# Patient Record
Sex: MALE | Race: BLACK OR AFRICAN AMERICAN | NOT HISPANIC OR LATINO | Employment: FULL TIME | URBAN - METROPOLITAN AREA
[De-identification: names, ages, dates, MRNs, and addresses within clinical notes are randomized per-mention and may not be internally consistent; named-entity substitution may affect disease eponyms.]

---

## 2021-11-30 ENCOUNTER — OFFICE VISIT (OUTPATIENT)
Dept: FAMILY MEDICINE | Facility: CLINIC | Age: 31
End: 2021-11-30
Payer: COMMERCIAL

## 2021-11-30 VITALS
TEMPERATURE: 98.2 F | RESPIRATION RATE: 16 BRPM | WEIGHT: 176.6 LBS | HEART RATE: 71 BPM | DIASTOLIC BLOOD PRESSURE: 86 MMHG | OXYGEN SATURATION: 100 % | SYSTOLIC BLOOD PRESSURE: 158 MMHG

## 2021-11-30 DIAGNOSIS — M54.50 PAIN IN RIGHT LUMBAR REGION OF BACK: ICD-10-CM

## 2021-11-30 DIAGNOSIS — M72.2 PLANTAR FASCIITIS: Primary | ICD-10-CM

## 2021-11-30 PROCEDURE — 99203 OFFICE O/P NEW LOW 30 MIN: CPT | Performed by: PHYSICIAN ASSISTANT

## 2021-11-30 RX ORDER — METHYLPREDNISOLONE 4 MG
TABLET, DOSE PACK ORAL
Qty: 21 TABLET | Refills: 0 | Status: SHIPPED | OUTPATIENT
Start: 2021-11-30 | End: 2021-12-20

## 2021-11-30 ASSESSMENT — ENCOUNTER SYMPTOMS
HEMATURIA: 0
FEVER: 0
WEAKNESS: 0
BACK PAIN: 1
ABDOMINAL PAIN: 0
FREQUENCY: 0
NUMBNESS: 1
UNEXPECTED WEIGHT CHANGE: 0
NAUSEA: 0
DIAPHORESIS: 0
CHILLS: 0
VOMITING: 0
DYSURIA: 0

## 2021-11-30 NOTE — PROGRESS NOTES
Patient presents with:  Back Pain: off and on x6-7months, R low back that radiates down thigh, now getting worse more constant      Clinical Decision Making: Patient experiencing left plantar foot pain in a job that requires longstanding.  Suspect plantar fasciitis.  Supportive cares were discussed, and heel cups were prescribed.  Patient also reports intermittent left hand numbness, suspect carpal tunnel given the repetitive hand motions using his job.  Recommend wrist brace.  Right lumbar back pain with radiation down sciatic nerve.  Suspect muscular strain or disc herniation.  Straight leg raise is negative, so disc herniation unlikely.  Recommend Medrol Dosepak and follow-up with primary care if symptoms fail to improve.      ICD-10-CM    1. Plantar fasciitis  M72.2 Ankle/Foot Bracing Supplies Order for DME - ONLY FOR DME   2. Pain in right lumbar region of back  M54.50 methylPREDNISolone (MEDROL DOSEPAK) 4 MG tablet therapy pack       Patient Instructions   Plantar Fasciitis:  1. Limit barefoot walking as much as possible. Wear supportive shoes with the heel cups cushions in them.   2. Ice the heels using a frozen sana cup of water.   3. Take Ibuprofen 600 mg every 6 hours for pain once you are done with the Medrol Dosepack.    4. Try to limit pounding exercises like running.   5. Wrap ankles with ACE wrap in a 90 degree angle when you sleep.   6. Follow up with your primary care provider if you are not having any improvement in your symptoms over the course of the next 10 days.   Plantar Fasciitis  Plantar fasciitis is a painful swelling of the plantar fascia. The plantar fascia is a thick, fibrous layer of tissue. It covers the bones on the bottom of your foot. And it supports the foot bones in an arched position.  This can happen gradually or suddenly. Heel pain can be sharp, like a knife sticking into the bottom of your foot. You may feel pain after exercising, long-distance jogging, stair climbing, long  periods of standing, or after standing up.  Risk factors include: non-active lifestyle, arthritis, diabetes, obesity or recent weight gain, flat foot, high arch. Wearing high heels, loose shoes, or shoes with poor arch support for long periods of time adds to the risk. This problem is commonly found in runners and dancers. It also found in people who stand on hard surfaces for long periods of time.  Foot pain from this condition is usually worse in the morning. But it improves with walking. By the end of the day there may be a dull aching. Treatment requires short-term rest and controlling swelling. It may take up to 9 months before all symptoms go away. Rarely, a steroid injection into the foot, or surgery, may be needed.  Home care    Choose supportive shoes with good arch support and shock absorbency. Replace athletic shoes when they become worn out. Don t walk or run barefoot.    Premade or custom-fitted shoe inserts may be helpful. Inserts made of silicone seem to be the most effective. Custom-made inserts can be provided by a podiatrist or foot specialist, physical therapist, or orthopedist.    Premade or custom-made night splints keep the heel stretched out while you sleep. They may prevent morning pain.    Avoid activities that stress the feet: jogging, prolonged standing or walking, contact sports, etc.    First thing in the morning and before sports, stretch the bottom of your feet. Gently flex your ankle so the toes move toward your knee.    Icing may help control heel pain. Apply an ice pack to the heel for 10-20 minutes as a preventive. Or ice your heel after a severe flare-up of symptoms. You may repeat this every 1-2 hours as needed.    You may use over-the-counter pain medicine to control pain, unless another medicine was prescribed. Anti-inflammatory pain medicines, such as ibuprofen or naproxen, may work better than acetaminophen. If you have chronic liver or kidney disease or ever had a stomach  ulcer or GI bleeding, talk with your healthcare provider before using these medicines.  When to seek medical advice  Call your healthcare provider right away if any of these occur:    Foot swelling    Redness with increasing pain    Carpal Tunnel:  1. Find carpal tunnel wrist brace and wear it during night and day to keep your wrist in a neutral position.     Lumbar Back Pain:   1.  Continue with light exercise such as soccer and walks.  2.  Begin taking Medrol Dosepak this medicine is best taken in the morning and with food.   3. Alternate between ice and heat on the back.   4. Avoid heavy lifting.   5.  Seek emergency medical attention if you develop loss of control of bowel or bladder function, saddle numbness, or lower extremity weakness.        HPI:  Alex Wallace is a 31 year old male who presents today complaining of right low back pain that is been going on and off for the past 6 or 7 months.  There is radiation of the pain down the right thigh.  Patient came in because pain is worsening and becoming more constant. Pain is worse when walking on incline and when sitting. Pain is better when laying flat or walking on flat ground. 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 He participates in soccer casually 2 times per week for an hour at a time. Back pain is better when he is playing soccer.     Patient also reports intermittent plantar foot pain.  He states it is worse when he is standing for long periods of time and his assembly job.  He generally wears work boots.  At times he experiences some numbness on the lateral aspect of his ankle.    Patient also reports intermittent numbness of the left hand.  His assembly job does require a lot of repetitive hand movements.  He denies any significant hand weakness.  He feels like her swelling in the wrist at times.  Patient is right-hand dominant    History obtained from the patient.    Problem List:  There are no relevant problems documented for  this patient.      No past medical history on file.    Social History     Tobacco Use     Smoking status: Light Tobacco Smoker     Smokeless tobacco: Never Used   Substance Use Topics     Alcohol use: Not on file       Review of Systems   Constitutional: Negative for chills, diaphoresis, fever and unexpected weight change.   Gastrointestinal: Negative for abdominal pain, nausea and vomiting.   Genitourinary: Negative for dysuria, enuresis, frequency and hematuria.   Musculoskeletal: Positive for back pain. Negative for gait problem.   Neurological: Positive for numbness (Lateral left ankle, and occasional left hand). Negative for weakness.       Vitals:    11/30/21 1125 11/30/21 1255   BP: (!) 156/89 (!) 158/86   BP Location:  Right arm   Patient Position:  Sitting   Cuff Size:  Adult Regular   Pulse: 87 71   Resp: 16    Temp: 98.2  F (36.8  C)    TempSrc: Oral    SpO2: 100%    Weight: 80.1 kg (176 lb 9.6 oz)        Physical Exam  Vitals and nursing note reviewed.   Constitutional:       General: He is not in acute distress.     Appearance: He is not toxic-appearing or diaphoretic.   HENT:      Head: Normocephalic and atraumatic.      Right Ear: External ear normal.      Left Ear: External ear normal.   Eyes:      Conjunctiva/sclera: Conjunctivae normal.   Pulmonary:      Effort: Pulmonary effort is normal. No respiratory distress.   Musculoskeletal:      Lumbar back: Tenderness present. No bony tenderness. Normal range of motion. Negative right straight leg raise test and negative left straight leg raise test.      Comments: No significant tenderness of the plantar surface of the left foot.   Neurological:      Mental Status: He is alert.      Motor: No weakness.   Psychiatric:         Mood and Affect: Mood normal.         Behavior: Behavior normal.         Thought Content: Thought content normal.         Judgment: Judgment normal.       At the end of the encounter, I discussed results, diagnosis, medications.  Discussed red flags for immediate return to clinic/ER, as well as indications for follow up if no improvement. Patient understood and agreed to plan. Patient was stable for discharge.

## 2021-11-30 NOTE — LETTER
November 30, 2021      Alex Wallace  [address] [address]        To Whom It May Concern:    Alex Wallace  was seen on 11/30/2021.  Please excuse him  until 12/7/2021 due to injury.        Sincerely,        Emilie Lambert PA-C

## 2021-11-30 NOTE — PATIENT INSTRUCTIONS
Plantar Fasciitis:  1. Limit barefoot walking as much as possible. Wear supportive shoes with the heel cups cushions in them.   2. Ice the heels using a frozen sana cup of water.   3. Take Ibuprofen 600 mg every 6 hours for pain once you are done with the Medrol Dosepack.    4. Try to limit pounding exercises like running.   5. Wrap ankles with ACE wrap in a 90 degree angle when you sleep.   6. Follow up with your primary care provider if you are not having any improvement in your symptoms over the course of the next 10 days.   Plantar Fasciitis  Plantar fasciitis is a painful swelling of the plantar fascia. The plantar fascia is a thick, fibrous layer of tissue. It covers the bones on the bottom of your foot. And it supports the foot bones in an arched position.  This can happen gradually or suddenly. Heel pain can be sharp, like a knife sticking into the bottom of your foot. You may feel pain after exercising, long-distance jogging, stair climbing, long periods of standing, or after standing up.  Risk factors include: non-active lifestyle, arthritis, diabetes, obesity or recent weight gain, flat foot, high arch. Wearing high heels, loose shoes, or shoes with poor arch support for long periods of time adds to the risk. This problem is commonly found in runners and dancers. It also found in people who stand on hard surfaces for long periods of time.  Foot pain from this condition is usually worse in the morning. But it improves with walking. By the end of the day there may be a dull aching. Treatment requires short-term rest and controlling swelling. It may take up to 9 months before all symptoms go away. Rarely, a steroid injection into the foot, or surgery, may be needed.  Home care    Choose supportive shoes with good arch support and shock absorbency. Replace athletic shoes when they become worn out. Don t walk or run barefoot.    Premade or custom-fitted shoe inserts may be helpful. Inserts made of silicone  seem to be the most effective. Custom-made inserts can be provided by a podiatrist or foot specialist, physical therapist, or orthopedist.    Premade or custom-made night splints keep the heel stretched out while you sleep. They may prevent morning pain.    Avoid activities that stress the feet: jogging, prolonged standing or walking, contact sports, etc.    First thing in the morning and before sports, stretch the bottom of your feet. Gently flex your ankle so the toes move toward your knee.    Icing may help control heel pain. Apply an ice pack to the heel for 10-20 minutes as a preventive. Or ice your heel after a severe flare-up of symptoms. You may repeat this every 1-2 hours as needed.    You may use over-the-counter pain medicine to control pain, unless another medicine was prescribed. Anti-inflammatory pain medicines, such as ibuprofen or naproxen, may work better than acetaminophen. If you have chronic liver or kidney disease or ever had a stomach ulcer or GI bleeding, talk with your healthcare provider before using these medicines.  When to seek medical advice  Call your healthcare provider right away if any of these occur:    Foot swelling    Redness with increasing pain    Carpal Tunnel:  1. Find carpal tunnel wrist brace and wear it during night and day to keep your wrist in a neutral position.     Lumbar Back Pain:   1.  Continue with light exercise such as soccer and walks.  2.  Begin taking Medrol Dosepak this medicine is best taken in the morning and with food.   3. Alternate between ice and heat on the back.   4. Avoid heavy lifting.   5.  Seek emergency medical attention if you develop loss of control of bowel or bladder function, saddle numbness, or lower extremity weakness.

## 2021-12-01 ENCOUNTER — TELEPHONE (OUTPATIENT)
Dept: FAMILY MEDICINE | Facility: CLINIC | Age: 31
End: 2021-12-01

## 2021-12-01 ENCOUNTER — VIRTUAL VISIT (OUTPATIENT)
Dept: FAMILY MEDICINE | Facility: CLINIC | Age: 31
End: 2021-12-01
Payer: COMMERCIAL

## 2021-12-01 DIAGNOSIS — R51.9 NONINTRACTABLE HEADACHE, UNSPECIFIED CHRONICITY PATTERN, UNSPECIFIED HEADACHE TYPE: Primary | ICD-10-CM

## 2021-12-01 DIAGNOSIS — R52 BODY ACHES: ICD-10-CM

## 2021-12-01 DIAGNOSIS — Z20.822 EXPOSURE TO 2019 NOVEL CORONAVIRUS: ICD-10-CM

## 2021-12-01 PROCEDURE — 99213 OFFICE O/P EST LOW 20 MIN: CPT | Mod: 95 | Performed by: FAMILY MEDICINE

## 2021-12-01 RX ORDER — METHYLPREDNISOLONE 4 MG
TABLET, DOSE PACK ORAL
COMMUNITY
Start: 2021-11-30 | End: 2021-12-20

## 2021-12-01 NOTE — TELEPHONE ENCOUNTER
Virtual visit with patient today regarding possible exposure to COVID-19.  Testing to be scheduled.  He has not previously been vaccinated for COVID-19, but is interested in vaccination.  He would like to schedule this through [facility].  Would you be able to phone him to help in setting up a nurse visit for the vaccine?   Thanks,  Julian Joaquin MD

## 2021-12-01 NOTE — PROGRESS NOTES
Alex is a 31 year old who is being evaluated via a billable telephone visit.      What phone number would you like to be contacted at? [phone]  How would you like to obtain your AVS? Mail a copy    Assessment & Plan     Alex was seen today for covid concern.    Diagnoses and all orders for this visit:    Nonintractable headache, unspecified chronicity pattern, unspecified headache type  -     Symptomatic COVID-19 Virus (Coronavirus) by PCR; Future    Body aches  -     Symptomatic COVID-19 Virus (Coronavirus) by PCR; Future    Exposure to 2019 novel coronavirus    recommendations as per patient instructions below:     Patient Instructions   I would recommend testing for COVID-19, as we had discussed.  You should be receiving a call from  within the next several hours, to help set up a time and location for testing to be performed. Please isolate yourself from the community, as well as from household members, at least until your test results return (typically within 24 hours). If you have access to [employer], your results will be available there and if you do not, a phone call will be made to you (if the test is positive) or a letter will be mailed to your home (if the test is negative).      If a positive test is obtained, we recommend that people isolate until :   1. symptoms are improving  2. they are without fever for at least 24 hours (without use of Ibuprofen or acetaminophen)  3. It has been at least 10 days since their symptoms began.     For close contacts of people who test positive for COVID-19, recommendation is for isolation for 14 days after most recent contact (this could be decreased to 10 days if negative COVID-19 testing is obtained).     We will also contact you to help in scheduling the COVID-19 vaccines.       Return today (on 12/1/2021) for COVID-19 testing (to be scheduled).    Julian Joaquin MD  [facility]   Alex is a 31 year old who  presents for the following health issues    HPI   COVID-19 exposure - occurred on 11/24/21 while at work.  Patient was in close contact with co-worker who subsequently (the following day) was diagnosed with COVID-19.  Patient began to note some headache and body aches on 11/27/21.  These have improved over the past couple of days.  Patient notes no cough, rhinorrhea, fevers/chills, sore throat, or diarrhea.  Appetite and po intake have been normal.  His employer is requiring negative COVID-19 testing before he can return to work.  He requests orders for this.       Objective    Vitals - Patient Reported  Weight (Patient Reported): 68 kg (150 lb)  Physical Exam   Objective    General: alert/oriented to person/place. Answers questions appropriately.   Affect: pleasant, cooperative.   Respiratory: no labored breathing noted.  No coughing during visit.    COVID-19 PCR : pending       Phone call duration: 8 minutes

## 2021-12-01 NOTE — PATIENT INSTRUCTIONS
I would recommend testing for COVID-19, as we had discussed.  You should be receiving a call from  within the next several hours, to help set up a time and location for testing to be performed. Please isolate yourself from the community, as well as from household members, at least until your test results return (typically within 24 hours). If you have access to "[employer] your results will be available there and if you do not, a phone call will be made to you (if the test is positive) or a letter will be mailed to your home (if the test is negative).      If a positive test is obtained, we recommend that people isolate until :   1. symptoms are improving  2. they are without fever for at least 24 hours (without use of Ibuprofen or acetaminophen)  3. It has been at least 10 days since their symptoms began.     For close contacts of people who test positive for COVID-19, recommendation is for isolation for 14 days after most recent contact (this could be decreased to 10 days if negative COVID-19 testing is obtained).     We will also contact you to help in scheduling the COVID-19 vaccines.

## 2021-12-03 ENCOUNTER — LAB (OUTPATIENT)
Dept: FAMILY MEDICINE | Facility: CLINIC | Age: 31
End: 2021-12-03
Attending: FAMILY MEDICINE
Payer: COMMERCIAL

## 2021-12-03 DIAGNOSIS — R52 BODY ACHES: ICD-10-CM

## 2021-12-03 DIAGNOSIS — R51.9 NONINTRACTABLE HEADACHE, UNSPECIFIED CHRONICITY PATTERN, UNSPECIFIED HEADACHE TYPE: ICD-10-CM

## 2021-12-03 PROCEDURE — U0003 INFECTIOUS AGENT DETECTION BY NUCLEIC ACID (DNA OR RNA); SEVERE ACUTE RESPIRATORY SYNDROME CORONAVIRUS 2 (SARS-COV-2) (CORONAVIRUS DISEASE [COVID-19]), AMPLIFIED PROBE TECHNIQUE, MAKING USE OF HIGH THROUGHPUT TECHNOLOGIES AS DESCRIBED BY CMS-2020-01-R: HCPCS

## 2021-12-03 PROCEDURE — U0005 INFEC AGEN DETEC AMPLI PROBE: HCPCS

## 2021-12-04 LAB — SARS-COV-2 RNA RESP QL NAA+PROBE: NEGATIVE

## 2021-12-08 NOTE — TELEPHONE ENCOUNTER
Pt has appt scheduled for 12/20 to see a provider in person. Will get vaccine at that time.    Jacquie Madrigal MA on 12/8/2021 at 9:23 AM

## 2021-12-12 ENCOUNTER — HEALTH MAINTENANCE LETTER (OUTPATIENT)
Age: 31
End: 2021-12-12

## 2021-12-20 ENCOUNTER — OFFICE VISIT (OUTPATIENT)
Dept: FAMILY MEDICINE | Facility: CLINIC | Age: 31
End: 2021-12-20
Payer: COMMERCIAL

## 2021-12-20 VITALS
WEIGHT: 183.5 LBS | TEMPERATURE: 98.3 F | RESPIRATION RATE: 16 BRPM | BODY MASS INDEX: 29.49 KG/M2 | DIASTOLIC BLOOD PRESSURE: 80 MMHG | OXYGEN SATURATION: 99 % | HEART RATE: 70 BPM | HEIGHT: 66 IN | SYSTOLIC BLOOD PRESSURE: 138 MMHG

## 2021-12-20 DIAGNOSIS — M54.41 CHRONIC RIGHT-SIDED LOW BACK PAIN WITH RIGHT-SIDED SCIATICA: Primary | ICD-10-CM

## 2021-12-20 DIAGNOSIS — G89.29 CHRONIC RIGHT-SIDED LOW BACK PAIN WITH RIGHT-SIDED SCIATICA: Primary | ICD-10-CM

## 2021-12-20 DIAGNOSIS — Z23 HIGH PRIORITY FOR 2019 NOVEL CORONAVIRUS VACCINATION: ICD-10-CM

## 2021-12-20 PROCEDURE — 99203 OFFICE O/P NEW LOW 30 MIN: CPT | Performed by: FAMILY MEDICINE

## 2021-12-20 PROCEDURE — 0011A COVID-19,PF,MODERNA (18+ YRS PRIMARY SERIES .5ML): CPT | Performed by: FAMILY MEDICINE

## 2021-12-20 PROCEDURE — 91301 COVID-19,PF,MODERNA (18+ YRS PRIMARY SERIES .5ML): CPT | Performed by: FAMILY MEDICINE

## 2021-12-20 ASSESSMENT — MIFFLIN-ST. JEOR: SCORE: 1734.07

## 2021-12-20 NOTE — PATIENT INSTRUCTIONS
It is safe to take:     IBUPROFEN 400 mg every 4 hours if needed    Tylenol 500 mg every 4 hours if needed.

## 2021-12-20 NOTE — PROGRESS NOTES
"PHI Wallace is a 31 year old male here for l  Right sided low back pain going to buttock and down leg. This pain first appeared about 7 years ago. He remembers he was leaning over to  something off the ground and the pain appeared. It got better but started again 4-5 months ago. Doesn't seem to get worse when standing. Sometimes happens when walking. Works in assembly. Stands for 8-10. Work doesn't make it hurt. Plays soccer, hasn't played for 3 months due to the weather being cold.     ?  O  /80   Pulse 70   Temp 98.3  F (36.8  C) (Oral)   Resp 16   Ht 1.683 m (5' 6.25\")   Wt 83.2 kg (183 lb 8 oz)   SpO2 99%   BMI 29.39 kg/m     Vitals reviewed. Nursing note reviewed.  General Appearance: Pleasant and alert, in no acute distress  MSK: forward flexion elicits pain going down left leg, straight leg raise positive on left side.   Ext: No peripheral edema, good distal perfusion  Skin: warm, dry, intact, no rash noted  Neuro: no focal deficits, CNs II-XII normal.   Psych: mood and affect are normal.    A/P  Alex was seen today for establish care and back pain.    Diagnoses and all orders for this visit:    Chronic right-sided low back pain with right-sided sciatica: advised tylenol and ibuprofen and explained physical therapy. I think this is crucial since he has had this for several years at this point.   -     Physical Therapy Referral; Future    High priority for 2019 novel coronavirus vaccination  -     COVID-19,PF,MODERNA (18+ YRS PRIMARY SERIES .5ML)    Other orders  -     MODERNA COVID-19 VACCINE 2ND DOSE APPT; Future         Return in about 1 year (around 12/20/2022) for Routine preventive.    Options for treatment and follow-up care were reviewed with the patient and/or guardian. Alex Wallace and/or guardian engaged in the decision making process and verbalized understanding of the options discussed and agreed with the final plan.    Kelly Pepe MD      "

## 2022-01-17 ENCOUNTER — IMMUNIZATION (OUTPATIENT)
Dept: NURSING | Facility: CLINIC | Age: 32
End: 2022-01-17
Attending: FAMILY MEDICINE
Payer: COMMERCIAL

## 2022-01-17 PROCEDURE — 91301 PR COVID VAC MODERNA 100 MCG/0.5 ML IM: CPT

## 2022-01-17 PROCEDURE — 0012A PR COVID VAC MODERNA 100 MCG/0.5 ML IM: CPT

## 2022-02-14 ENCOUNTER — HOSPITAL ENCOUNTER (OUTPATIENT)
Dept: PHYSICAL THERAPY | Facility: REHABILITATION | Age: 32
End: 2022-02-14
Attending: FAMILY MEDICINE
Payer: COMMERCIAL

## 2022-02-14 DIAGNOSIS — G89.29 CHRONIC RIGHT-SIDED LOW BACK PAIN WITH RIGHT-SIDED SCIATICA: ICD-10-CM

## 2022-02-14 DIAGNOSIS — M54.41 CHRONIC RIGHT-SIDED LOW BACK PAIN WITH RIGHT-SIDED SCIATICA: ICD-10-CM

## 2022-02-14 PROCEDURE — 97161 PT EVAL LOW COMPLEX 20 MIN: CPT | Mod: GP | Performed by: PHYSICAL THERAPIST

## 2022-02-14 PROCEDURE — 97110 THERAPEUTIC EXERCISES: CPT | Mod: GP | Performed by: PHYSICAL THERAPIST

## 2022-02-14 PROCEDURE — 97140 MANUAL THERAPY 1/> REGIONS: CPT | Mod: GP | Performed by: PHYSICAL THERAPIST

## 2022-02-18 NOTE — PROGRESS NOTES
"   02/14/22 1500   General Information   Type of Visit Initial OP Ortho PT Evaluation   Start of Care Date 02/14/22   Referring Physician Dr. Kelly Pepe   Patient/Family Goals Statement To improve back pain   Orders Evaluate and Treat   Date of Order 12/20/21   Certification Required? No   Medical Diagnosis Chronic right-sided low back pain with right-sided sciatica   Surgical/Medical history reviewed Yes       Present No   Body Part(s)   Body Part(s) Lumbar Spine/SI   Presentation and Etiology   Pertinent history of current problem (include personal factors and/or comorbidities that impact the POC) Per MD note on 12/20/21: \"Right sided low back pain going to buttock and down leg. This pain first appeared about 7 years ago. He remembers he was leaning over to  something off the ground and the pain appeared. It got better but started again 4-5 months ago. Doesn't seem to get worse when standing. Sometimes happens when walking. 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 Work doesn't make it hurt. Plays soccer, hasn't played for 3 months due to the weather being cold.\"   Impairments A. Pain   Functional Limitations perform activities of daily living;perform required work activities   Symptom Location R sided low back pain into R LE; not below the knee   How/Where did it occur While lifting  (This occurred 7 years)   Onset date of current episode/exacerbation 11/01/21   Chronicity Recurrent   Pain rating (0-10 point scale) Best (/10);Worst (/10)  (Current: 8/10)   Best (/10) 4/10   Worst (/10) 10/10   Pain quality G. Cramping;E. Shooting   Frequency of pain/symptoms A. Constant   Pain/symptoms are: The same all the time   Pain/symptoms exacerbated by B. Walking;C. Lifting;I. Bending  (Twisting)   Pain/symptoms eased by C. Rest;E. Changing positions   Progression of symptoms since onset: Worsened   Prior Level of Function   Functional Level Prior Comment Independent with all mobility.  Pt " enjoys playing soccer but hasn't been playing due to the weather and pain over the past 4-5 months.     Current Level of Function   Current Community Support Family/friend caregiver   Patient role/employment history A. Employed   Employment Comments Pt works as a  where he stands for the majority of the day   Living environment Apartment/condo   Home/community accessibility Stairs to enter but pt denies any difficulty   Current equipment-Gait/Locomotion None   Current equipment-ADL None   Fall Risk Screen   Fall screen completed by PT   Have you fallen 2 or more times in the past year? No   Have you fallen and had an injury in the past year? No   Is patient a fall risk? No   Abuse Screen (yes response referral indicated)   Feels Unsafe at Home or Work/School no   Feels Threatened by Someone no   Does Anyone Try to Keep You From Having Contact with Others or Doing Things Outside Your Home? no   Physical Signs of Abuse Present no   System Outcome Measures   Outcome Measures Low Back Pain (see Oswestry and Matti)  (48%)   Lumbar Spine/SI Objective Findings   Posture WNL   Gait/Locomotion Decreased R LE stance   Flexion ROM 90 deg; increased R LE symptoms   Extension ROM WNL; c/o R sided back pain   Right Side Bending ROM 46.5 cm   Left Side Bending ROM 46 cm; increased pain on R   Pelvic Screen WNL   Hip Screen Increased mm guarding on R   Transversus Abdominus Strength (Edson Leg Lowering-deg) WNL   Lumbar/Hip/Knee/Foot Strength Comments B LE WNL, grossly 5/5   Hamstring Flexibility Limited on R   SLR + on R   Crossover SLR -   Slump Test + on R   Spring Test Painful on R; increased mm guarding   Segmental Mobility WNL   Palpation Increased myofascial tone in lumbar area, Bl[place]; Limited R anterior hip capsule mobility   Planned Therapy Interventions   Planned Therapy Interventions joint mobilization;manual therapy;neuromuscular re-education;ROM;strengthening;stretching   Clinical Impression   Criteria for  Skilled Therapeutic Interventions Met yes, treatment indicated   PT Diagnosis Chronic right-sided low back pain with right-sided sciatica   Influenced by the following impairments Limited hip mobility, limited hip/back mm length; increased lumbar mm tone; radicular pain into R LE   Functional limitations due to impairments Walking, lifting, bending, twisting for LB dressing/shoveling snow   Clinical Presentation Stable/Uncomplicated   Clinical Decision Making (Complexity) Low complexity   Therapy Frequency 1 time/week   Predicted Duration of Therapy Intervention (days/wks) 6-12 weeks for 10 visits   Risk & Benefits of therapy have been explained Yes   Patient, Family & other staff in agreement with plan of care Yes   Clinical Impression Comments Pt is a 31 yo who presents with back pain that travels down the pt's R LE.  Signs and symptoms consistent with radiculopathy as pt has (+) R slump and R SLR.  Pt responds well to manual therapy in session and given exercises to improve low back and hip mobilty.  The pt would benefit from skilled 1:1 PT to address his physical and functional impairments listed above.    Education Assessment   Barriers to Learning No barriers   ORTHO GOALS   PT Ortho Eval Goals 1;2;3;4   Ortho Goal 1   Goal Description Pt will be independent with HEP for ongoing symptom management in 12 weeks.    Ortho Goal 2   Goal Description Pt will improve his THEA by 12% to demo overall functional improvement in 12 weeks.    Ortho Goal 3   Goal Description Pt will be able to bend or twist his back for ease of dressing with no c/o pain in 12 weeks.    Ortho Goal 4   Goal Description Pt will be able to walk 1 hour with max c/o 2-3/10 low back pain in 12 weeks.    Total Evaluation Time   PT Eval, Low Complexity Minutes (09835) 30

## 2022-03-07 ENCOUNTER — HOSPITAL ENCOUNTER (OUTPATIENT)
Dept: PHYSICAL THERAPY | Facility: REHABILITATION | Age: 32
End: 2022-03-07
Payer: COMMERCIAL

## 2022-03-07 DIAGNOSIS — G89.29 CHRONIC RIGHT-SIDED LOW BACK PAIN WITH RIGHT-SIDED SCIATICA: Primary | ICD-10-CM

## 2022-03-07 DIAGNOSIS — M54.41 CHRONIC RIGHT-SIDED LOW BACK PAIN WITH RIGHT-SIDED SCIATICA: Primary | ICD-10-CM

## 2022-03-07 PROCEDURE — 97140 MANUAL THERAPY 1/> REGIONS: CPT | Mod: GP | Performed by: PHYSICAL THERAPIST

## 2022-03-07 PROCEDURE — 97110 THERAPEUTIC EXERCISES: CPT | Mod: GP | Performed by: PHYSICAL THERAPIST

## 2022-03-15 ENCOUNTER — HOSPITAL ENCOUNTER (OUTPATIENT)
Dept: PHYSICAL THERAPY | Facility: REHABILITATION | Age: 32
Discharge: HOME | End: 2022-03-15
Payer: COMMERCIAL

## 2022-03-15 DIAGNOSIS — G89.29 CHRONIC RIGHT-SIDED LOW BACK PAIN WITH RIGHT-SIDED SCIATICA: Primary | ICD-10-CM

## 2022-03-15 DIAGNOSIS — M54.41 CHRONIC RIGHT-SIDED LOW BACK PAIN WITH RIGHT-SIDED SCIATICA: Primary | ICD-10-CM

## 2022-03-15 PROCEDURE — 97110 THERAPEUTIC EXERCISES: CPT | Mod: GP

## 2022-03-15 PROCEDURE — 97140 MANUAL THERAPY 1/> REGIONS: CPT | Mod: GP

## 2022-03-22 ENCOUNTER — HOSPITAL ENCOUNTER (OUTPATIENT)
Dept: PHYSICAL THERAPY | Facility: REHABILITATION | Age: 32
Discharge: HOME | End: 2022-03-22
Payer: COMMERCIAL

## 2022-03-22 DIAGNOSIS — G89.29 CHRONIC RIGHT-SIDED LOW BACK PAIN WITH RIGHT-SIDED SCIATICA: Primary | ICD-10-CM

## 2022-03-22 DIAGNOSIS — M54.41 CHRONIC RIGHT-SIDED LOW BACK PAIN WITH RIGHT-SIDED SCIATICA: Primary | ICD-10-CM

## 2022-03-22 PROCEDURE — 97140 MANUAL THERAPY 1/> REGIONS: CPT | Mod: GP | Performed by: PHYSICAL THERAPIST

## 2022-03-22 PROCEDURE — 97110 THERAPEUTIC EXERCISES: CPT | Mod: GP | Performed by: PHYSICAL THERAPIST

## 2022-10-03 ENCOUNTER — HEALTH MAINTENANCE LETTER (OUTPATIENT)
Age: 32
End: 2022-10-03

## 2023-02-11 ENCOUNTER — HEALTH MAINTENANCE LETTER (OUTPATIENT)
Age: 33
End: 2023-02-11

## 2024-03-09 ENCOUNTER — HEALTH MAINTENANCE LETTER (OUTPATIENT)
Age: 34
End: 2024-03-09

## 2025-03-16 ENCOUNTER — HEALTH MAINTENANCE LETTER (OUTPATIENT)
Age: 35
End: 2025-03-16